# Patient Record
Sex: MALE | Race: WHITE | Employment: FULL TIME | URBAN - METROPOLITAN AREA
[De-identification: names, ages, dates, MRNs, and addresses within clinical notes are randomized per-mention and may not be internally consistent; named-entity substitution may affect disease eponyms.]

---

## 2017-11-02 ENCOUNTER — HOSPITAL ENCOUNTER (OUTPATIENT)
Age: 37
Setting detail: OBSERVATION
LOS: 1 days | Discharge: HOME | End: 2017-11-03
Attending: EMERGENCY MEDICINE | Admitting: FAMILY MEDICINE
Payer: COMMERCIAL

## 2017-11-02 ENCOUNTER — APPOINTMENT (OUTPATIENT)
Dept: GENERAL RADIOLOGY | Age: 37
End: 2017-11-02
Payer: COMMERCIAL

## 2017-11-02 DIAGNOSIS — I10 ESSENTIAL HYPERTENSION: ICD-10-CM

## 2017-11-02 DIAGNOSIS — E11.00 UNCONTROLLED TYPE 2 DIABETES MELLITUS WITH HYPEROSMOLARITY WITHOUT COMA, WITHOUT LONG-TERM CURRENT USE OF INSULIN (HCC): Primary | ICD-10-CM

## 2017-11-02 DIAGNOSIS — E66.9 OBESITY (BMI 30.0-34.9): ICD-10-CM

## 2017-11-02 PROBLEM — E10.9 TYPE 1 DIABETES MELLITUS WITHOUT COMPLICATION (HCC): Status: ACTIVE | Noted: 2017-11-02

## 2017-11-02 LAB
ABSOLUTE EOS #: 0.08 K/UL (ref 0–0.44)
ABSOLUTE IMMATURE GRANULOCYTE: 0.04 K/UL (ref 0–0.3)
ABSOLUTE LYMPH #: 2.52 K/UL (ref 1.1–3.7)
ABSOLUTE MONO #: 0.67 K/UL (ref 0.1–1.2)
ANION GAP SERPL CALCULATED.3IONS-SCNC: 16 MMOL/L (ref 9–17)
BASOPHILS # BLD: 1 % (ref 0–2)
BASOPHILS ABSOLUTE: 0.04 K/UL (ref 0–0.2)
BETA-HYDROXYBUTYRATE: 0.17 MMOL/L (ref 0.02–0.27)
BILIRUBIN URINE: NEGATIVE
BUN BLDV-MCNC: 14 MG/DL (ref 6–20)
BUN/CREAT BLD: ABNORMAL (ref 9–20)
CALCIUM SERPL-MCNC: 9 MG/DL (ref 8.6–10.4)
CHLORIDE BLD-SCNC: 94 MMOL/L (ref 98–107)
CHOLESTEROL/HDL RATIO: 8.8
CHOLESTEROL: 211 MG/DL
CO2: 24 MMOL/L (ref 20–31)
COLOR: YELLOW
COMMENT UA: ABNORMAL
CREAT SERPL-MCNC: 0.81 MG/DL (ref 0.7–1.2)
CREATININE URINE: 112.3 MG/DL (ref 39–259)
DIFFERENTIAL TYPE: ABNORMAL
EKG ATRIAL RATE: 92 BPM
EKG P-R INTERVAL: 126 MS
EKG Q-T INTERVAL: 356 MS
EKG QRS DURATION: 82 MS
EKG QTC CALCULATION (BAZETT): 440 MS
EKG R AXIS: 142 DEGREES
EKG T AXIS: 161 DEGREES
EKG VENTRICULAR RATE: 92 BPM
EOSINOPHILS RELATIVE PERCENT: 1 % (ref 1–4)
ESTIMATED AVERAGE GLUCOSE: 240 MG/DL
FOLATE: 12.9 NG/ML
GFR AFRICAN AMERICAN: >60 ML/MIN
GFR NON-AFRICAN AMERICAN: >60 ML/MIN
GFR SERPL CREATININE-BSD FRML MDRD: ABNORMAL ML/MIN/{1.73_M2}
GFR SERPL CREATININE-BSD FRML MDRD: ABNORMAL ML/MIN/{1.73_M2}
GLUCOSE BLD-MCNC: 316 MG/DL (ref 75–110)
GLUCOSE BLD-MCNC: 356 MG/DL (ref 75–110)
GLUCOSE BLD-MCNC: 362 MG/DL (ref 75–110)
GLUCOSE BLD-MCNC: 461 MG/DL (ref 75–110)
GLUCOSE BLD-MCNC: 465 MG/DL (ref 75–110)
GLUCOSE BLD-MCNC: 507 MG/DL (ref 75–110)
GLUCOSE BLD-MCNC: 538 MG/DL (ref 75–110)
GLUCOSE BLD-MCNC: 556 MG/DL (ref 70–99)
GLUCOSE URINE: ABNORMAL
HBA1C MFR BLD: 10 % (ref 4–6)
HCT VFR BLD CALC: 45.3 % (ref 40.7–50.3)
HDLC SERPL-MCNC: 24 MG/DL
HEMOGLOBIN: 14.8 G/DL (ref 13–17)
IMMATURE GRANULOCYTES: 1 %
INSULIN COMMENT: NORMAL
INSULIN REFERENCE RANGE:: NORMAL
INSULIN: 55.2 MU/L
KETONES, URINE: NEGATIVE
LDL CHOLESTEROL: 109 MG/DL (ref 0–130)
LEUKOCYTE ESTERASE, URINE: NEGATIVE
LYMPHOCYTES # BLD: 32 % (ref 24–43)
MCH RBC QN AUTO: 27.9 PG (ref 25.2–33.5)
MCHC RBC AUTO-ENTMCNC: 32.7 G/DL (ref 29.9–34.7)
MCV RBC AUTO: 85.5 FL (ref 82.6–102.9)
MICROALBUMIN/CREAT 24H UR: <12 MG/L
MICROALBUMIN/CREAT UR-RTO: NORMAL MCG/MG CREAT
MONOCYTES # BLD: 9 % (ref 3–12)
NITRITE, URINE: NEGATIVE
PDW BLD-RTO: 12.6 % (ref 11.8–14.4)
PH UA: 5 (ref 5–8)
PLATELET # BLD: ABNORMAL K/UL (ref 138–453)
PLATELET ESTIMATE: ABNORMAL
PLATELET, FLUORESCENCE: NORMAL K/UL (ref 138–453)
PLATELET, IMMATURE FRACTION: NORMAL % (ref 1.1–10.3)
PMV BLD AUTO: ABNORMAL FL (ref 8.1–13.5)
POC TROPONIN I: 0 NG/ML (ref 0–0.1)
POC TROPONIN I: 0 NG/ML (ref 0–0.1)
POC TROPONIN INTERP: NORMAL
POC TROPONIN INTERP: NORMAL
POTASSIUM SERPL-SCNC: 4.1 MMOL/L (ref 3.7–5.3)
PROTEIN UA: NEGATIVE
RBC # BLD: 5.3 M/UL (ref 4.21–5.77)
RBC # BLD: ABNORMAL 10*6/UL
SEG NEUTROPHILS: 58 % (ref 36–65)
SEGMENTED NEUTROPHILS ABSOLUTE COUNT: 4.53 K/UL (ref 1.5–8.1)
SODIUM BLD-SCNC: 134 MMOL/L (ref 135–144)
SPECIFIC GRAVITY UA: 1.03 (ref 1–1.03)
TRIGL SERPL-MCNC: 388 MG/DL
TURBIDITY: CLEAR
URINE HGB: NEGATIVE
UROBILINOGEN, URINE: NORMAL
VITAMIN B-12: 500 PG/ML (ref 211–946)
VITAMIN D 25-HYDROXY: 26.5 NG/ML (ref 30–100)
VLDLC SERPL CALC-MCNC: ABNORMAL MG/DL (ref 1–30)
WBC # BLD: 7.9 K/UL (ref 3.5–11.3)
WBC # BLD: ABNORMAL 10*3/UL

## 2017-11-02 PROCEDURE — 82306 VITAMIN D 25 HYDROXY: CPT

## 2017-11-02 PROCEDURE — 2580000003 HC RX 258: Performed by: NURSE PRACTITIONER

## 2017-11-02 PROCEDURE — 84484 ASSAY OF TROPONIN QUANT: CPT

## 2017-11-02 PROCEDURE — 93005 ELECTROCARDIOGRAM TRACING: CPT

## 2017-11-02 PROCEDURE — 82043 UR ALBUMIN QUANTITATIVE: CPT

## 2017-11-02 PROCEDURE — 80061 LIPID PANEL: CPT

## 2017-11-02 PROCEDURE — G0378 HOSPITAL OBSERVATION PER HR: HCPCS

## 2017-11-02 PROCEDURE — 82947 ASSAY GLUCOSE BLOOD QUANT: CPT

## 2017-11-02 PROCEDURE — 82570 ASSAY OF URINE CREATININE: CPT

## 2017-11-02 PROCEDURE — 6370000000 HC RX 637 (ALT 250 FOR IP): Performed by: FAMILY MEDICINE

## 2017-11-02 PROCEDURE — 6370000000 HC RX 637 (ALT 250 FOR IP): Performed by: NURSE PRACTITIONER

## 2017-11-02 PROCEDURE — 96360 HYDRATION IV INFUSION INIT: CPT

## 2017-11-02 PROCEDURE — 99285 EMERGENCY DEPT VISIT HI MDM: CPT

## 2017-11-02 PROCEDURE — 82746 ASSAY OF FOLIC ACID SERUM: CPT

## 2017-11-02 PROCEDURE — 2580000003 HC RX 258: Performed by: STUDENT IN AN ORGANIZED HEALTH CARE EDUCATION/TRAINING PROGRAM

## 2017-11-02 PROCEDURE — 99219 PR INITIAL OBSERVATION CARE/DAY 50 MINUTES: CPT | Performed by: FAMILY MEDICINE

## 2017-11-02 PROCEDURE — 82607 VITAMIN B-12: CPT

## 2017-11-02 PROCEDURE — 36415 COLL VENOUS BLD VENIPUNCTURE: CPT

## 2017-11-02 PROCEDURE — 83525 ASSAY OF INSULIN: CPT

## 2017-11-02 PROCEDURE — G0108 DIAB MANAGE TRN  PER INDIV: HCPCS

## 2017-11-02 PROCEDURE — 82010 KETONE BODYS QUAN: CPT

## 2017-11-02 PROCEDURE — 6370000000 HC RX 637 (ALT 250 FOR IP): Performed by: STUDENT IN AN ORGANIZED HEALTH CARE EDUCATION/TRAINING PROGRAM

## 2017-11-02 PROCEDURE — 85025 COMPLETE CBC W/AUTO DIFF WBC: CPT

## 2017-11-02 PROCEDURE — 96361 HYDRATE IV INFUSION ADD-ON: CPT

## 2017-11-02 PROCEDURE — 83036 HEMOGLOBIN GLYCOSYLATED A1C: CPT

## 2017-11-02 PROCEDURE — 81003 URINALYSIS AUTO W/O SCOPE: CPT

## 2017-11-02 PROCEDURE — 86038 ANTINUCLEAR ANTIBODIES: CPT

## 2017-11-02 PROCEDURE — 71020 XR CHEST STANDARD TWO VW: CPT

## 2017-11-02 PROCEDURE — 80048 BASIC METABOLIC PNL TOTAL CA: CPT

## 2017-11-02 RX ORDER — SODIUM CHLORIDE 450 MG/100ML
INJECTION, SOLUTION INTRAVENOUS CONTINUOUS
Status: DISCONTINUED | OUTPATIENT
Start: 2017-11-02 | End: 2017-11-03 | Stop reason: HOSPADM

## 2017-11-02 RX ORDER — NICOTINE POLACRILEX 4 MG
15 LOZENGE BUCCAL PRN
Status: DISCONTINUED | OUTPATIENT
Start: 2017-11-02 | End: 2017-11-03 | Stop reason: HOSPADM

## 2017-11-02 RX ORDER — SODIUM CHLORIDE 0.9 % (FLUSH) 0.9 %
10 SYRINGE (ML) INJECTION PRN
Status: DISCONTINUED | OUTPATIENT
Start: 2017-11-02 | End: 2017-11-03 | Stop reason: HOSPADM

## 2017-11-02 RX ORDER — BISACODYL 10 MG
10 SUPPOSITORY, RECTAL RECTAL DAILY PRN
Status: DISCONTINUED | OUTPATIENT
Start: 2017-11-02 | End: 2017-11-03 | Stop reason: HOSPADM

## 2017-11-02 RX ORDER — POTASSIUM CHLORIDE 7.45 MG/ML
10 INJECTION INTRAVENOUS PRN
Status: DISCONTINUED | OUTPATIENT
Start: 2017-11-02 | End: 2017-11-03 | Stop reason: HOSPADM

## 2017-11-02 RX ORDER — FAMOTIDINE 20 MG/1
20 TABLET, FILM COATED ORAL 2 TIMES DAILY
Status: DISCONTINUED | OUTPATIENT
Start: 2017-11-02 | End: 2017-11-03 | Stop reason: HOSPADM

## 2017-11-02 RX ORDER — ACETAMINOPHEN 325 MG/1
650 TABLET ORAL EVERY 4 HOURS PRN
Status: CANCELLED | OUTPATIENT
Start: 2017-11-02

## 2017-11-02 RX ORDER — ACETAMINOPHEN 325 MG/1
650 TABLET ORAL EVERY 4 HOURS PRN
Status: DISCONTINUED | OUTPATIENT
Start: 2017-11-02 | End: 2017-11-03 | Stop reason: HOSPADM

## 2017-11-02 RX ORDER — SODIUM CHLORIDE 0.9 % (FLUSH) 0.9 %
10 SYRINGE (ML) INJECTION EVERY 12 HOURS SCHEDULED
Status: DISCONTINUED | OUTPATIENT
Start: 2017-11-02 | End: 2017-11-03 | Stop reason: HOSPADM

## 2017-11-02 RX ORDER — LISINOPRIL 5 MG/1
5 TABLET ORAL DAILY
Status: DISCONTINUED | OUTPATIENT
Start: 2017-11-02 | End: 2017-11-03 | Stop reason: HOSPADM

## 2017-11-02 RX ORDER — MAGNESIUM SULFATE 1 G/100ML
1 INJECTION INTRAVENOUS PRN
Status: DISCONTINUED | OUTPATIENT
Start: 2017-11-02 | End: 2017-11-03 | Stop reason: HOSPADM

## 2017-11-02 RX ORDER — INSULIN GLARGINE 100 [IU]/ML
10 INJECTION, SOLUTION SUBCUTANEOUS NIGHTLY
Status: DISCONTINUED | OUTPATIENT
Start: 2017-11-02 | End: 2017-11-03

## 2017-11-02 RX ORDER — POTASSIUM CHLORIDE 20MEQ/15ML
40 LIQUID (ML) ORAL PRN
Status: DISCONTINUED | OUTPATIENT
Start: 2017-11-02 | End: 2017-11-03 | Stop reason: HOSPADM

## 2017-11-02 RX ORDER — POTASSIUM CHLORIDE 20 MEQ/1
40 TABLET, EXTENDED RELEASE ORAL PRN
Status: DISCONTINUED | OUTPATIENT
Start: 2017-11-02 | End: 2017-11-03 | Stop reason: HOSPADM

## 2017-11-02 RX ORDER — DEXTROSE MONOHYDRATE 25 G/50ML
12.5 INJECTION, SOLUTION INTRAVENOUS PRN
Status: DISCONTINUED | OUTPATIENT
Start: 2017-11-02 | End: 2017-11-03 | Stop reason: HOSPADM

## 2017-11-02 RX ORDER — 0.9 % SODIUM CHLORIDE 0.9 %
1000 INTRAVENOUS SOLUTION INTRAVENOUS ONCE
Status: COMPLETED | OUTPATIENT
Start: 2017-11-02 | End: 2017-11-02

## 2017-11-02 RX ORDER — NICOTINE 21 MG/24HR
1 PATCH, TRANSDERMAL 24 HOURS TRANSDERMAL DAILY PRN
Status: DISCONTINUED | OUTPATIENT
Start: 2017-11-02 | End: 2017-11-03 | Stop reason: HOSPADM

## 2017-11-02 RX ORDER — DEXTROSE MONOHYDRATE 50 MG/ML
100 INJECTION, SOLUTION INTRAVENOUS PRN
Status: DISCONTINUED | OUTPATIENT
Start: 2017-11-02 | End: 2017-11-03 | Stop reason: HOSPADM

## 2017-11-02 RX ORDER — MORPHINE SULFATE 4 MG/ML
4 INJECTION, SOLUTION INTRAMUSCULAR; INTRAVENOUS
Status: DISCONTINUED | OUTPATIENT
Start: 2017-11-02 | End: 2017-11-02

## 2017-11-02 RX ORDER — MORPHINE SULFATE 2 MG/ML
2 INJECTION, SOLUTION INTRAMUSCULAR; INTRAVENOUS
Status: DISCONTINUED | OUTPATIENT
Start: 2017-11-02 | End: 2017-11-02

## 2017-11-02 RX ORDER — ONDANSETRON 2 MG/ML
4 INJECTION INTRAMUSCULAR; INTRAVENOUS EVERY 6 HOURS PRN
Status: DISCONTINUED | OUTPATIENT
Start: 2017-11-02 | End: 2017-11-03 | Stop reason: HOSPADM

## 2017-11-02 RX ORDER — HYDROCODONE BITARTRATE AND ACETAMINOPHEN 5; 325 MG/1; MG/1
1 TABLET ORAL EVERY 4 HOURS PRN
Status: DISCONTINUED | OUTPATIENT
Start: 2017-11-02 | End: 2017-11-03 | Stop reason: HOSPADM

## 2017-11-02 RX ADMIN — SODIUM CHLORIDE: 4.5 INJECTION, SOLUTION INTRAVENOUS at 19:13

## 2017-11-02 RX ADMIN — SODIUM CHLORIDE: 4.5 INJECTION, SOLUTION INTRAVENOUS at 11:44

## 2017-11-02 RX ADMIN — METFORMIN HYDROCHLORIDE 500 MG: 500 TABLET ORAL at 11:40

## 2017-11-02 RX ADMIN — INSULIN LISPRO 8 UNITS: 100 INJECTION, SOLUTION INTRAVENOUS; SUBCUTANEOUS at 16:51

## 2017-11-02 RX ADMIN — ACETAMINOPHEN 650 MG: 325 TABLET ORAL at 16:57

## 2017-11-02 RX ADMIN — SODIUM CHLORIDE: 4.5 INJECTION, SOLUTION INTRAVENOUS at 04:42

## 2017-11-02 RX ADMIN — SODIUM CHLORIDE 1000 ML: 9 INJECTION, SOLUTION INTRAVENOUS at 01:27

## 2017-11-02 RX ADMIN — INSULIN GLARGINE 10 UNITS: 100 INJECTION, SOLUTION SUBCUTANEOUS at 21:20

## 2017-11-02 RX ADMIN — INSULIN LISPRO 12 UNITS: 100 INJECTION, SOLUTION INTRAVENOUS; SUBCUTANEOUS at 07:32

## 2017-11-02 RX ADMIN — INSULIN HUMAN 5 UNITS: 100 INJECTION, SOLUTION PARENTERAL at 01:50

## 2017-11-02 RX ADMIN — FAMOTIDINE 20 MG: 20 TABLET, FILM COATED ORAL at 21:20

## 2017-11-02 RX ADMIN — INSULIN LISPRO 5 UNITS: 100 INJECTION, SOLUTION INTRAVENOUS; SUBCUTANEOUS at 21:20

## 2017-11-02 RX ADMIN — LISINOPRIL 5 MG: 5 TABLET ORAL at 10:22

## 2017-11-02 RX ADMIN — LINAGLIPTIN 5 MG: 5 TABLET, FILM COATED ORAL at 11:40

## 2017-11-02 RX ADMIN — INSULIN LISPRO 10 UNITS: 100 INJECTION, SOLUTION INTRAVENOUS; SUBCUTANEOUS at 10:56

## 2017-11-02 RX ADMIN — METFORMIN HYDROCHLORIDE 500 MG: 500 TABLET ORAL at 18:11

## 2017-11-02 RX ADMIN — FAMOTIDINE 20 MG: 20 TABLET, FILM COATED ORAL at 08:11

## 2017-11-02 ASSESSMENT — PAIN SCALES - GENERAL
PAINLEVEL_OUTOF10: 4
PAINLEVEL_OUTOF10: 5

## 2017-11-02 ASSESSMENT — ENCOUNTER SYMPTOMS
NAUSEA: 0
BACK PAIN: 0
VOMITING: 0
SHORTNESS OF BREATH: 0
DIARRHEA: 0
WHEEZING: 0
ABDOMINAL PAIN: 0

## 2017-11-02 NOTE — PLAN OF CARE
Problem: Falls - Risk of:  Goal: Will remain free from falls  Will remain free from falls  Outcome: Ongoing  Patient free from falls this shift.

## 2017-11-02 NOTE — ED PROVIDER NOTES
[facility] ED     Emergency Department     Faculty Attestation    I performed a history and physical examination of the patient and discussed management with the resident. I reviewed the residents note and agree with the documented findings and plan of care. Any areas of disagreement are noted on the chart. I was personally present for the key portions of any procedures. I have documented in the chart those procedures where I was not present during the key portions. I have reviewed the emergency nurses triage note. I agree with the chief complaint, past medical history, past surgical history, allergies, medications, social and family history as documented unless otherwise noted below. For Physician Assistant/ Nurse Practitioner cases/documentation I have personally evaluated this patient and have completed at least one if not all key elements of the E/M (history, physical exam, and MDM). Additional findings are as noted. Patient presents stating that over the past week he has had increased dizziness, visual changes, increased thirst and urination. The patient has no significant medical history but says he has not seen a physician or had any lab testing done in several years. On arrival here, patient's blood glucose is in the 500s. We'll plan to get additional labs and admit patient for new onset diabetes. Patient does not have a family physician.     EKG Interpretation    Interpreted by emergency department physician    Rhythm: normal sinus   Rate: normal  Axis: right  Ectopy: none  Conduction: normal  ST Segments: normal  T Waves: non specific changes  Q Waves: none    Clinical Impression: non-specific EKG    Bruce Garcia MD  Attending Emergency  Physician              Desmond Souza MD  11/02/17 0120

## 2017-11-02 NOTE — PROGRESS NOTES
[address]  Occupational Therapy Not Seen Note     Patient not available for Occupational Therapy due to:     [] Testing:     [] Hemodialysis     [] Blood Transfusion in Progress     []Refusal by Patient:      [] Surgery/Procedure:     [] Strict Bedrest     [] Sedation     [] Spine Precautions      [] Pt being transferred to palliative care at this time. Spoke with pt/family and OT services to be defered.     [x] Pt independent with functional mobility and functional tasks.  Pt with no OT acute care needs at this time, will defer OT eval.     [] Other    Next Scheduled Treatment: N/A     Signature: Calvin Esparza, OTR/L

## 2017-11-02 NOTE — ED NOTES
Bed: 18  Expected date: 11/2/17  Expected time: 12:27 AM  Means of arrival: [facility]  Comments:  CARLI Lucio 82, RN  11/02/17 0827

## 2017-11-02 NOTE — PROGRESS NOTES
Behzad Rubio,  Seen for evaluation and education on Other: New onset type 2 diabetes. Had all the classic symptoms. Bs at admission 505. Awaiting results of HgbA1c. Pt was feeling crummy and was working on getting a PCP prior to admission. Realizes he doesn't eat healthy and is ready to make changes. Worked with pt and family. Pt's grandfather had diabetes (no longer alive) and has an aquaintance with type 1 diabetes on an insulin pump. No results found for: LABA1C  No results found for: EAG    Discussed with Piaregan Ramiro, his current self care routines prior to this admission. 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 Didn't review insulin administration as MD said pt to go home with Januvia and metformin. Showed use of blood glucose monitoring.  (after breakfast). Briefly discussed role of diet, physical activity, daily use of medications and self monitoring for good diabetes control, provided diabetes education folder. Also referred patient to view diabetes education programs on education TV - Channel 75 and 76 at 9am, 3pm 6pm and 9pm      Discussed need to be aware of sign and symptoms of hypoglycemia and hyperglycemia  and treatment for hypoglycemia and hyperglycemia. When to call PCP for advice / sick day plan. Provided patient with card and contact information for out patient  Diabetes education services and encouraged follow up with a PCP. Would recommend follow -up education at outpatient diabetes education at [place] [id number]. An ordered is needed for this service and can be placed via EHR. Discharge Navigator --- Med Reconciliation -- New orders for discharge tab -- search diabetic ed - choose REF 20 -- review and sign. Gave pt info on our program as well as [facility] where he is closer.      Patient will need prescriptions for the following supplies at discharge:   Preferred / formulary

## 2017-11-02 NOTE — ED PROVIDER NOTES
[facility] [address]  ED  Emergency Department Encounter  Emergency Medicine Resident     Pt Name: Markie Pickens  MRN: [id number]  Armstrongfjayme 1980  Date of evaluation: 11/2/17  PCP:  No primary care provider on file. CHIEF COMPLAINT       Chief Complaint   Patient presents with    Urinary Frequency    Polydipsia    Fatigue    Dizziness       HISTORY OF PRESENT ILLNESS  (Location/Symptom, Timing/Onset, Context/Setting, Quality, Duration, Modifying Factors, Severity.)      Srinivasa Thacker is a 29 y.o. male who presents with Illness for fatigue, dizziness, polyuria, polydipsia. Patient states that he was at work today felt extremely fatigued and dizzy and sat down and his boss called 911. He notes that over the past 5 days he has been increasingly thirsty and \"clean a lot\". He notes that he does not have a primary care physician and does not regularly follow up with any medical provider. Denies any chest pain, shortness of breath, nausea, vomiting, diarrhea, fever, chills. PAST MEDICAL / SURGICAL / SOCIAL / FAMILY HISTORY      has a past medical history of No active medical problems. has a past surgical history that includes Foot surgery (Left). Social History     Social History    Marital status:      Spouse name: N/A    Number of children: N/A    Years of education: N/A     Occupational History    Not on file. Social History Main Topics    Smoking status: Never Smoker    Smokeless tobacco: Current User    Alcohol use No    Drug use: No    Sexual activity: Not on file     Other Topics Concern    Not on file     Social History Narrative    No narrative on file       Patient was advised to stop smoking or to avoid tobacco use    History reviewed. No pertinent family history.     Allergies:  Pcn [penicillins]    Home Medications:  Prior to Admission medications    Not on File       REVIEW OF SYSTEMS    (2-9 systems for level 4, 10 or more for level 5)      Review of Systems   Constitutional: Positive for appetite change and fatigue. Negative for activity change, chills and fever. Eyes: Positive for visual disturbance. Respiratory: Negative for shortness of breath and wheezing. Cardiovascular: Negative for chest pain and palpitations. Gastrointestinal: Negative for abdominal pain, diarrhea, nausea and vomiting. Endocrine: Positive for polydipsia and polyuria. Genitourinary: Positive for frequency. Negative for difficulty urinating, dysuria and hematuria. Musculoskeletal: Negative for back pain and neck pain. Neurological: Positive for dizziness. Negative for speech difficulty and headaches. Psychiatric/Behavioral: Negative for suicidal ideas. PHYSICAL EXAM   (up to 7 for level 4, 8 or more for level 5)      INITIAL VITALS:   BP (!) 152/83   Pulse 81   Temp 97.5 °F (36.4 °C) (Oral)   Resp 20   Wt 220 lb (99.8 kg)   SpO2 97%     Physical Exam   Constitutional: He is oriented to person, place, and time. He appears well-developed and well-nourished. No distress. HENT:   Head: Normocephalic and atraumatic. Eyes: Conjunctivae and EOM are normal. Pupils are equal, round, and reactive to light. Neck: Normal range of motion. Neck supple. Cardiovascular: Normal rate, regular rhythm, normal heart sounds and intact distal pulses. Pulmonary/Chest: Effort normal and breath sounds normal. No stridor. No respiratory distress. He has no wheezes. Abdominal: Soft. Bowel sounds are normal. There is no tenderness. There is no rebound. Musculoskeletal: Normal range of motion. He exhibits no edema. Neurological: He is alert and oriented to person, place, and time. No cranial nerve deficit. Skin: Skin is warm and dry. He is not diaphoretic. Psychiatric: He has a normal mood and affect. Thought content normal.   Nursing note and vitals reviewed.     DIFFERENTIAL  DIAGNOSIS     PLAN (LABS / IMAGING / EKG):  Orders Placed This Encounter   Procedures    XR CHEST STANDARD (2 VW)    CBC Auto Differential    Basic Metabolic Panel    HEMOGLOBIN A1C    Beta-Hydroxybutyrate    URINALYSIS    Immature Platelet Fraction    Inpatient consult to Hospitalist    POCT troponin    POCT troponin    POC Glucose Fingerstick    EKG 12 Lead       MEDICATIONS ORDERED:  Orders Placed This Encounter   Medications    0.9 % sodium chloride bolus    insulin regular (HUMULIN R;NOVOLIN R) injection 5 Units       DDX: Diabetes, DKA, dehydration, electrolyte imbalance, closed head injury, heat related emergency, rhabdomyolysis.  Arrhythmia, ACS,     DIAGNOSTIC RESULTS / EMERGENCY DEPARTMENT COURSE / MDM     LABS:  Results for orders placed or performed during the hospital encounter of 11/02/17   CBC Auto Differential   Result Value Ref Range    WBC 7.9 3.5 - 11.3 k/uL    RBC 5.30 4.21 - 5.77 m/uL    Hemoglobin 14.8 13.0 - 17.0 g/dL    Hematocrit 45.3 40.7 - 50.3 %    MCV 85.5 82.6 - 102.9 fL    MCH 27.9 25.2 - 33.5 pg    MCHC 32.7 29.9 - 34.7 g/dL    RDW 12.6 11.8 - 14.4 %    Platelets See Reflexed IPF Result 138 - 453 k/uL    MPV NOT REPORTED 8.1 - 13.5 fL    Differential Type NOT REPORTED     WBC Morphology NOT REPORTED     RBC Morphology NOT REPORTED     Platelet Estimate NOT REPORTED     Seg Neutrophils 58 36 - 65 %    Lymphocytes 32 24 - 43 %    Monocytes 9 3 - 12 %    Eosinophils % 1 1 - 4 %    Basophils 1 0 - 2 %    Immature Granulocytes 1 (H) 0 %    Segs Absolute 4.53 1.50 - 8.10 k/uL    Absolute Lymph # 2.52 1.10 - 3.70 k/uL    Absolute Mono # 0.67 0.10 - 1.20 k/uL    Absolute Eos # 0.08 0.00 - 0.44 k/uL    Basophils # 0.04 0.00 - 0.20 k/uL    Absolute Immature Granulocyte 0.04 0.00 - 0.30 k/uL   Basic Metabolic Panel   Result Value Ref Range    Glucose 556 (HH) 70 - 99 mg/dL    BUN 14 6 - 20 mg/dL    CREATININE 0.81 0.70 - 1.20 mg/dL    Bun/Cre Ratio NOT REPORTED 9 - 20    Calcium 9.0 8.6 - 10.4 mg/dL    Sodium 134 (L) 135 - 144 mmol/L    Potassium 4.1 3.7 - 5.3 mmol/L this chart in the absence of a cardiologist.    EMERGENCY DEPARTMENT COURSE:  Patient is a 52-year-old male who presents with a five-day history of polydipsia, polyuria, and fatigue. Patient was found to have new diagnosed diabetes was given a fluid, 5 units of regular insulin and admitted to [facility] service for Diabetic education, further evaluation and monitoring. [facility] provider called and accepted the admission to [place] [address] floor. Facilitated admission orders were placed. PROCEDURES:  None    CONSULTS:  IP CONSULT TO HOSPITALIST      FINAL IMPRESSION      1. Uncontrolled type 2 diabetes mellitus with hyperosmolarity without coma, without long-term current use of insulin ([facility] [address]          DISPOSITION / PLAN     DISPOSITION Decision To Admit    PATIENT REFERRED TO:  No follow-up provider specified.     DISCHARGE MEDICATIONS:  New Prescriptions    No medications on file       Luis Manuel Ervin DO  Emergency Medicine Resident    (Please note that portions of this note were completed with a voice recognition program.  Efforts were made to edit the dictations but occasionally words are mis-transcribed.)       Luis Manuel Ervin DO  Resident  11/02/17 1235

## 2017-11-02 NOTE — H&P
[facility]    HISTORY AND PHYSICAL EXAMINATION            Date:   11/2/2017  Patient name:  Jada Corea  Date of admission:  11/2/2017 12:44 AM  MRN:   [id number]  Account:  [de-identified]  YOB: 1980  PCP:    No primary care provider on file. Room:   [address] [place] [address]  Code Status:    Full Code    Chief Complaint:     Chief Complaint   Patient presents with    Urinary Frequency    Polydipsia    Fatigue    Dizziness       History Obtained From:     patient, electronic medical record. This is a 76-year-old male patient with no significant past medical history and does not have a PCP was well until a week ago when he started to have gradual onset of fatigue, dizziness, polyuria, polydipsia, and it was something that he would not tolerate well. He was at work he felt extremely dizzy and his employer called 911 and presented to the ED and was noted to have elevated blood sugar, round 500 and was admitted for further management. Patient is currently on insulin 10 units Lantus, And no sliding scale. Patient works as a  and has very minimal exercise and he has a family history of diabetes. Patient works at night and has poor diet patterns. He prefers oral medication but once the best as he is newly diagnosed diabetes. Past Medical History:     Past Medical History:   Diagnosis Date    DM (diabetes mellitus) ([facility] [address]     No active medical problems     Obesity         Past Surgical History:     Past Surgical History:   Procedure Laterality Date    FOOT SURGERY Left         Medications Prior to Admission:     Prior to Admission medications    Not on File        Allergies:     Pcn [penicillins]    Social History:     Tobacco:    reports that he has never smoked. He uses smokeless tobacco.  Alcohol:      reports that he does not drink alcohol. Drug Use:  reports that he does not use drugs.     Family History:     Family movements intact, conjunctiva clear  Ear: normal external ear, no discharge, hearing intact  Nose:  no drainage noted  Mouth: mucous membranes moist  Neck: supple, no carotid bruits, thyroid not palpable  Lungs: Bilateral equal air entry, clear to ausculation, no wheezing, rales or rhonchi, normal effort  Cardiovascular: normal rate, regular rhythm, no murmur, gallop, rub. Abdomen: Soft, nontender, nondistended, normal bowel sounds, no hepatomegaly or splenomegaly  Neurologic: There are no new focal motor or sensory deficits, normal muscle tone and bulk, no abnormal sensation, normal speech, cranial nerves II through XII grossly intact  Skin: No gross lesions, rashes, bruising or bleeding on exposed skin area  Extremities:  peripheral pulses palpable, no pedal edema or calf pain with palpation  Psych: normal affect     Osteopathic Examination: negative  negative for  myalgias, arthralgias, pain, swelling, stiffness, decreased range of motion, muscle weakness or bone pain. There is no evidence of kyphosis, scoliosis, or lordosis. Patient refused examination  Unable to perform due to patients current medical condition    Investigations:      Laboratory Testing:  Recent Results (from the past 24 hour(s))   POCT troponin    Collection Time: 11/02/17 12:50 AM   Result Value Ref Range    POC Troponin I 0.00 0.00 - 0.10 ng/mL    POC Troponin Interp       The Troponin-I (POC) results cannot be compared to the Troponin-T results.    EKG 12 Lead    Collection Time: 11/02/17 12:54 AM   Result Value Ref Range    Ventricular Rate 92 BPM    Atrial Rate 92 BPM    P-R Interval 126 ms    QRS Duration 82 ms    Q-T Interval 356 ms    QTc Calculation (Bazett) 440 ms    R Axis 142 degrees    T Axis 161 degrees   POC Glucose Fingerstick    Collection Time: 11/02/17  1:05 AM   Result Value Ref Range    POC Glucose 538 (HH) 75 - 110 mg/dL   CBC Auto Differential    Collection Time: 11/02/17  1:07 AM   Result Value Ref Range    WBC 7.9 3.5 - 11.3 k/uL    RBC 5.30 4.21 - 5.77 m/uL    Hemoglobin 14.8 13.0 - 17.0 g/dL    Hematocrit 45.3 40.7 - 50.3 %    MCV 85.5 82.6 - 102.9 fL    MCH 27.9 25.2 - 33.5 pg    MCHC 32.7 29.9 - 34.7 g/dL    RDW 12.6 11.8 - 14.4 %    Platelets See Reflexed IPF Result 138 - 453 k/uL    MPV NOT REPORTED 8.1 - 13.5 fL    Differential Type NOT REPORTED     WBC Morphology NOT REPORTED     RBC Morphology NOT REPORTED     Platelet Estimate NOT REPORTED     Seg Neutrophils 58 36 - 65 %    Lymphocytes 32 24 - 43 %    Monocytes 9 3 - 12 %    Eosinophils % 1 1 - 4 %    Basophils 1 0 - 2 %    Immature Granulocytes 1 (H) 0 %    Segs Absolute 4.53 1.50 - 8.10 k/uL    Absolute Lymph # 2.52 1.10 - 3.70 k/uL    Absolute Mono # 0.67 0.10 - 1.20 k/uL    Absolute Eos # 0.08 0.00 - 0.44 k/uL    Basophils # 0.04 0.00 - 0.20 k/uL    Absolute Immature Granulocyte 0.04 0.00 - 0.30 k/uL   Basic Metabolic Panel    Collection Time: 11/02/17  1:07 AM   Result Value Ref Range    Glucose 556 (HH) 70 - 99 mg/dL    BUN 14 6 - 20 mg/dL    CREATININE 0.81 0.70 - 1.20 mg/dL    Bun/Cre Ratio NOT REPORTED 9 - 20    Calcium 9.0 8.6 - 10.4 mg/dL    Sodium 134 (L) 135 - 144 mmol/L    Potassium 4.1 3.7 - 5.3 mmol/L    Chloride 94 (L) 98 - 107 mmol/L    CO2 24 20 - 31 mmol/L    Anion Gap 16 9 - 17 mmol/L    GFR Non-African American >60 >60 mL/min    GFR African American >60 >60 mL/min    GFR Comment          GFR Staging NOT REPORTED    Beta-Hydroxybutyrate    Collection Time: 11/02/17  1:07 AM   Result Value Ref Range    Beta-Hydroxybutyrate 0.17 0.02 - 0.27 mmol/L   URINALYSIS    Collection Time: 11/02/17  1:07 AM   Result Value Ref Range    Color, UA YELLOW YEL    Turbidity UA CLEAR CLEAR    Glucose, Ur 3+ (A) NEG    Bilirubin Urine NEGATIVE NEG    Ketones, Urine NEGATIVE NEG    Specific Gravity, UA 1.034 (H) 1.005 - 1.030    Urine Hgb NEGATIVE NEG    pH, UA 5.0 5.0 - 8.0    Protein, UA NEGATIVE NEG    Urobilinogen, Urine Normal NORM    Nitrite, Urine NEGATIVE NEG a hospital setting. Renetta Ruby MD  11/2/2017  4:01 PM    Copy sent to Dr. Marizol Beavers primary care provider on file.

## 2017-11-02 NOTE — PLAN OF CARE
Problem: Falls - Risk of:  Goal: Will remain free from falls  Will remain free from falls   Outcome: Met This Shift

## 2017-11-02 NOTE — PLAN OF CARE
Problem: Nutrition  Goal: Optimal nutrition therapy  Outcome: Ongoing  Nutrition Problem:  (Food and nutrition knowledge-related deficit )  Intervention: Food and/or Nutrient Delivery: Modify current diet  Nutritional Goals: Pt able to identify 3 carbohydrate sources and serving sizes.

## 2017-11-03 VITALS
OXYGEN SATURATION: 96 % | BODY MASS INDEX: 41.67 KG/M2 | RESPIRATION RATE: 21 BRPM | HEART RATE: 63 BPM | SYSTOLIC BLOOD PRESSURE: 128 MMHG | TEMPERATURE: 97.9 F | DIASTOLIC BLOOD PRESSURE: 94 MMHG | HEIGHT: 67 IN | WEIGHT: 265.5 LBS

## 2017-11-03 PROBLEM — E55.9 VITAMIN D INSUFFICIENCY: Status: ACTIVE | Noted: 2017-11-03

## 2017-11-03 LAB
ALBUMIN SERPL-MCNC: 3.5 G/DL (ref 3.5–5.2)
ALBUMIN/GLOBULIN RATIO: 1.3 (ref 1–2.5)
ALP BLD-CCNC: 85 U/L (ref 40–129)
ALT SERPL-CCNC: 82 U/L (ref 5–41)
ANION GAP SERPL CALCULATED.3IONS-SCNC: 12 MMOL/L (ref 9–17)
ANTI-NUCLEAR ANTIBODY (ANA): NEGATIVE
AST SERPL-CCNC: 39 U/L
BILIRUB SERPL-MCNC: 0.39 MG/DL (ref 0.3–1.2)
BUN BLDV-MCNC: 17 MG/DL (ref 6–20)
BUN/CREAT BLD: ABNORMAL (ref 9–20)
CALCIUM SERPL-MCNC: 8.6 MG/DL (ref 8.6–10.4)
CHLORIDE BLD-SCNC: 102 MMOL/L (ref 98–107)
CO2: 23 MMOL/L (ref 20–31)
CREAT SERPL-MCNC: 0.77 MG/DL (ref 0.7–1.2)
GFR AFRICAN AMERICAN: >60 ML/MIN
GFR NON-AFRICAN AMERICAN: >60 ML/MIN
GFR SERPL CREATININE-BSD FRML MDRD: ABNORMAL ML/MIN/{1.73_M2}
GFR SERPL CREATININE-BSD FRML MDRD: ABNORMAL ML/MIN/{1.73_M2}
GLUCOSE BLD-MCNC: 294 MG/DL (ref 75–110)
GLUCOSE BLD-MCNC: 345 MG/DL (ref 70–99)
GLUCOSE BLD-MCNC: 346 MG/DL (ref 75–110)
GLUCOSE BLD-MCNC: 367 MG/DL (ref 75–110)
HCT VFR BLD CALC: 45 % (ref 40.7–50.3)
HEMOGLOBIN: 14.5 G/DL (ref 13–17)
MCH RBC QN AUTO: 28.7 PG (ref 25.2–33.5)
MCHC RBC AUTO-ENTMCNC: 32.2 G/DL (ref 29.9–34.7)
MCV RBC AUTO: 89.1 FL (ref 82.6–102.9)
PDW BLD-RTO: 12.9 % (ref 11.8–14.4)
PLATELET # BLD: 137 K/UL (ref 138–453)
PMV BLD AUTO: 12.3 FL (ref 8.1–13.5)
POTASSIUM SERPL-SCNC: 4.2 MMOL/L (ref 3.7–5.3)
RBC # BLD: 5.05 M/UL (ref 4.21–5.77)
SODIUM BLD-SCNC: 137 MMOL/L (ref 135–144)
TOTAL PROTEIN: 6.2 G/DL (ref 6.4–8.3)
TSH SERPL DL<=0.05 MIU/L-ACNC: 1.83 MIU/L (ref 0.3–5)
WBC # BLD: 9.9 K/UL (ref 3.5–11.3)

## 2017-11-03 PROCEDURE — 80053 COMPREHEN METABOLIC PANEL: CPT

## 2017-11-03 PROCEDURE — 96361 HYDRATE IV INFUSION ADD-ON: CPT

## 2017-11-03 PROCEDURE — 82947 ASSAY GLUCOSE BLOOD QUANT: CPT

## 2017-11-03 PROCEDURE — 6370000000 HC RX 637 (ALT 250 FOR IP): Performed by: NURSE PRACTITIONER

## 2017-11-03 PROCEDURE — 36415 COLL VENOUS BLD VENIPUNCTURE: CPT

## 2017-11-03 PROCEDURE — 6370000000 HC RX 637 (ALT 250 FOR IP): Performed by: FAMILY MEDICINE

## 2017-11-03 PROCEDURE — 85027 COMPLETE CBC AUTOMATED: CPT

## 2017-11-03 PROCEDURE — 84443 ASSAY THYROID STIM HORMONE: CPT

## 2017-11-03 PROCEDURE — 2580000003 HC RX 258: Performed by: NURSE PRACTITIONER

## 2017-11-03 PROCEDURE — 99217 PR OBSERVATION CARE DISCHARGE MANAGEMENT: CPT | Performed by: FAMILY MEDICINE

## 2017-11-03 PROCEDURE — G0378 HOSPITAL OBSERVATION PER HR: HCPCS

## 2017-11-03 RX ORDER — LISINOPRIL 5 MG/1
5 TABLET ORAL DAILY
Qty: 30 TABLET | Refills: 3 | Status: SHIPPED | OUTPATIENT
Start: 2017-11-04 | End: 2018-02-13 | Stop reason: SDUPTHER

## 2017-11-03 RX ORDER — GLIPIZIDE 5 MG/1
5 TABLET ORAL
Status: DISCONTINUED | OUTPATIENT
Start: 2017-11-03 | End: 2017-11-03 | Stop reason: HOSPADM

## 2017-11-03 RX ORDER — LANCETS
EACH MISCELLANEOUS
Qty: 100 EACH | Refills: 3 | Status: SHIPPED | OUTPATIENT
Start: 2017-11-03

## 2017-11-03 RX ORDER — GLIPIZIDE 5 MG/1
5 TABLET ORAL
Qty: 60 TABLET | Refills: 3 | Status: SHIPPED | OUTPATIENT
Start: 2017-11-04 | End: 2017-12-04 | Stop reason: SDUPTHER

## 2017-11-03 RX ADMIN — FAMOTIDINE 20 MG: 20 TABLET, FILM COATED ORAL at 09:02

## 2017-11-03 RX ADMIN — INSULIN LISPRO 8 UNITS: 100 INJECTION, SOLUTION INTRAVENOUS; SUBCUTANEOUS at 09:51

## 2017-11-03 RX ADMIN — METFORMIN HYDROCHLORIDE 500 MG: 500 TABLET ORAL at 15:56

## 2017-11-03 RX ADMIN — LISINOPRIL 5 MG: 5 TABLET ORAL at 09:02

## 2017-11-03 RX ADMIN — INSULIN LISPRO 10 UNITS: 100 INJECTION, SOLUTION INTRAVENOUS; SUBCUTANEOUS at 17:09

## 2017-11-03 RX ADMIN — INSULIN LISPRO 6 UNITS: 100 INJECTION, SOLUTION INTRAVENOUS; SUBCUTANEOUS at 12:04

## 2017-11-03 RX ADMIN — GLIPIZIDE 5 MG: 5 TABLET ORAL at 15:56

## 2017-11-03 RX ADMIN — METFORMIN HYDROCHLORIDE 1000 MG: 500 TABLET ORAL at 17:09

## 2017-11-03 RX ADMIN — ACETAMINOPHEN 650 MG: 325 TABLET ORAL at 07:32

## 2017-11-03 RX ADMIN — LINAGLIPTIN 5 MG: 5 TABLET, FILM COATED ORAL at 09:02

## 2017-11-03 RX ADMIN — SODIUM CHLORIDE: 4.5 INJECTION, SOLUTION INTRAVENOUS at 11:53

## 2017-11-03 RX ADMIN — METFORMIN HYDROCHLORIDE 500 MG: 500 TABLET ORAL at 09:02

## 2017-11-03 ASSESSMENT — PAIN SCALES - GENERAL
PAINLEVEL_OUTOF10: 4
PAINLEVEL_OUTOF10: 5

## 2017-11-03 NOTE — DISCHARGE SUMMARY
11/03/2017    K 4.2 11/03/2017     11/03/2017    CO2 23 11/03/2017    ANIONGAP 12 11/03/2017    BUN 17 11/03/2017    CREATININE 0.77 11/03/2017    BUNCRER NOT REPORTED 11/03/2017    CALCIUM 8.6 11/03/2017    LABGLOM >60 11/03/2017    GFRAA >60 11/03/2017    GFR      11/03/2017    GFR NOT REPORTED 11/03/2017     HFP:    Lab Results   Component Value Date    PROT 6.2 11/03/2017     CMP:    Lab Results   Component Value Date    GLUCOSE 345 11/03/2017     11/03/2017    K 4.2 11/03/2017     11/03/2017    CO2 23 11/03/2017    BUN 17 11/03/2017    CREATININE 0.77 11/03/2017    ANIONGAP 12 11/03/2017    ALKPHOS 85 11/03/2017    ALT 82 11/03/2017    AST 39 11/03/2017    BILITOT 0.39 11/03/2017    LABALBU 3.5 11/03/2017    ALBUMIN 1.3 11/03/2017    LABGLOM >60 11/03/2017    GFRAA >60 11/03/2017    GFR      11/03/2017    GFR NOT REPORTED 11/03/2017    PROT 6.2 11/03/2017    CALCIUM 8.6 11/03/2017     PT/INR:  No results found for: PTINR  PTT: No results found for: APTT  FLP:    Lab Results   Component Value Date    CHOL 211 11/02/2017    TRIG 388 11/02/2017    HDL 24 11/02/2017     U/A:    Lab Results   Component Value Date    COLORU YELLOW 11/02/2017    TURBIDITY CLEAR 11/02/2017    SPECGRAV 1.034 11/02/2017    HGBUR NEGATIVE 11/02/2017    PHUR 5.0 11/02/2017    PROTEINU NEGATIVE 11/02/2017    GLUCOSEU 3+ 11/02/2017    KETUA NEGATIVE 11/02/2017    BILIRUBINUR NEGATIVE 11/02/2017    UROBILINOGEN Normal 11/02/2017    NITRU NEGATIVE 11/02/2017    LEUKOCYTESUR NEGATIVE 11/02/2017     TSH:    Lab Results   Component Value Date    TSH 1.83 11/03/2017     HBA1C       Radiology:    Xr Chest Standard (2 Vw)    Result Date: 11/2/2017  EXAMINATION: TWO VIEWS OF THE CHEST 11/2/2017 1:17 am COMPARISON: None. HISTORY: ORDERING SYSTEM PROVIDED HISTORY: near syncope TECHNOLOGIST PROVIDED HISTORY: Reason for exam:->near syncope FINDINGS: Heart size and configuration are normal.  The lungs are clear.   No pneumothorax or pleural effusion. There is a small azygos fissure. No acute bone finding. No acute cardiopulmonary disease. Consultations:    Consults:     Final Specialist Recommendations/Findings:   IP CONSULT TO HOSPITALIST  IP CONSULT TO DIABETES EDUCATOR  IP CONSULT TO DIETITIAN      The patient was seen and examined on day of discharge and this discharge summary is in conjunction with any daily progress note from day of discharge. Discharge plan:     Disposition: Home    Physician Follow Up:      [facility] PCP. PATIENT WILL SEE PCP NEXT WEEK TUESDAY    Requiring Further Evaluation/Follow Up POST HOSPITALIZATION/Incidental Findings: DM MANAGEMENT.  AST ELEVATION FOLLOW UP AND LOW PLATELETS FOLLOW UP    Diet: diabetic diet    Activity: As tolerated    Instructions to Patient: DIET AND EXERCISE AND CLOSE GLUCOSE MANAGEMENT.WEIGHT LOSS    Discharge Medications:      Medication List      START taking these medications    Accu-Chek Multiclix Lancets Misc  Check blood sugar fasting and 2 hour after lunch an ddinner and keep a record and take it to your doctor on tuesday     glipiZIDE 5 MG tablet  Commonly known as:  GLUCOTROL  Take 1 tablet by mouth every morning (before breakfast)  Start taking on:  11/4/2017     lisinopril 5 MG tablet  Commonly known as:  PRINIVIL;ZESTRIL  Take 1 tablet by mouth daily  Start taking on:  11/4/2017     metFORMIN 1000 MG tablet  Commonly known as:  GLUCOPHAGE  Take 1 tablet by mouth 2 times daily (with meals)  Start taking on:  11/4/2017     SITagliptin 100 MG tablet  Commonly known as:  JANUVIA  Take 1 tablet by mouth daily           Where to Get Your Medications      These medications were sent to [facility] [address] [facility], [address] [facility]  [address] [facility], [address]  [address]    Phone:  [phone]   · Accu-Chek Multiclix Lancets Misc  · glipiZIDE 5 MG tablet  · lisinopril 5 MG tablet  · metFORMIN 1000 MG tablet  · SITagliptin 100 MG tablet         Time Spent on discharge is  35 mins in patient examination, evaluation, counseling as well as medication reconciliation, prescriptions for required medications, discharge plan and follow up. Electronically signed by   Charanjit Hernández MD  11/3/2017  6:07 PM      Thank you for the opportunity to be involved in this patient's care.

## 2017-11-03 NOTE — PROGRESS NOTES
appointment on Tuesday. Overall, he sugars have been trending downwards, but still elevated. Patient was discharged in stable condition on oral medication    Review of Systems:     Constitutional:  negative for chills, fevers, sweats  Respiratory:  negative for cough, dyspnea on exertion, hemoptysis, shortness of breath, wheezing  Cardiovascular:  negative for chest pain, chest pressure/discomfort, lower extremity edema, palpitations  Gastrointestinal:  negative for abdominal pain, constipation, diarrhea, nausea, vomiting  Neurological:  negative for dizziness, headache    Medications: Allergies: Allergies   Allergen Reactions    Pcn [Penicillins] Swelling and Rash       Current Meds:   Scheduled Meds:    metFORMIN  1,000 mg Oral BID WC    glipiZIDE  5 mg Oral QAM AC    sodium chloride flush  10 mL Intravenous 2 times per day    sodium chloride flush  10 mL Intravenous 2 times per day    famotidine  20 mg Oral BID    insulin lispro  0-12 Units Subcutaneous TID WC    insulin lispro  0-6 Units Subcutaneous Nightly    lisinopril  5 mg Oral Daily    linagliptin  5 mg Oral Daily     Continuous Infusions:    sodium chloride 125 mL/hr at 11/03/17 1153    dextrose       PRN Meds: sodium chloride flush, sodium chloride flush, potassium chloride **OR** potassium chloride **OR** potassium chloride, magnesium sulfate, acetaminophen, HYDROcodone 5 mg - acetaminophen, magnesium hydroxide, bisacodyl, ondansetron, nicotine, glucose, dextrose, glucagon (rDNA), dextrose, HYDROmorphone **OR** HYDROmorphone    Data:     Past Medical History:   has a past medical history of DM (diabetes mellitus) ([facility] [address] No active medical problems; and Obesity. Social History:   reports that he has never smoked. He uses smokeless tobacco. He reports that he does not drink alcohol or use drugs.      Family History:   Family History   Problem Relation Age of Onset    Diabetes Paternal Grandmother        Vitals:  BP (!) 128/94 Pulse 63   Temp 97.9 °F (36.6 °C) (Oral)   Resp 21   Ht 5' 7\" (1.702 m)   Wt 265 lb 8 oz (120.4 kg)   SpO2 96%   BMI 41.58 kg/m²   Temp (24hrs), Av.9 °F (36.6 °C), Min:97.9 °F (36.6 °C), Max:97.9 °F (36.6 °C)    Recent Labs      17   0817   1158  17   1614   POCGLU  356*  346*  294*  367*       I/O (24Hr):   No intake or output data in the 24 hours ending 17    Labs:    Hematology:  Recent Labs      17   0655   WBC  7.9  9.9   RBC  5.30  5.05   HGB  14.8  14.5   HCT  45.3  45.0   MCV  85.5  89.1   MCH  27.9  28.7   MCHC  32.7  32.2   RDW  12.6  12.9   PLT  See Reflexed IPF Result  137*   MPV  NOT REPORTED  12.3     Chemistry:  Recent Labs      17   0050  17   0107  17   0300  17   0655   NA   --   134*   --   137   K   --   4.1   --   4.2   CL   --   94*   --   102   CO2   --   24   --   23   GLUCOSE   --   556*   --   345*   BUN   --   14   --   17   CREATININE   --   0.81   --   0.77   ANIONGAP   --   16   --   12   LABGLOM   --   >60   --   >60   GFRAA   --   >60   --   >60   CALCIUM   --   9.0   --   8.6   TROPONINI  0.00   --   0.00   --      Recent Labs      17   01017   1046  17   1052  17   1644  17   0655  17   0807  17   1158  17   1614   PROT   --    --    --    --    --    --   6.2*   --    --    --    LABALBU   --    --    --    --    --    --   3.5   --    --    --    LABA1C  10.0*   --    --    --    --    --    --    --    --    --    TSH   --    --    --    --    --    --   1.83   --    --    --    AST   --    --    --    --    --    --   39   --    --    --    ALT   --    --    --    --    --    --   80*   --    --    --    ALKPHOS   --    --    --    --    --    --   85   --    --    --    BILITOT   --    --    --    --    --    --   0.39   --    --    --    CHOL   --    --   211*   --    --    --    --    --    --    -- HDL   --    --   24*   --    --    --    --    --    --    --    LDLCHOLESTEROL   --    --   109   --    --    --    --    --    --    --    CHOLHDLRATIO   --    --   8.8*   --    --    --    --    --    --    --    TRIG   --    --   388*   --    --    --    --    --    --    --    VLDL   --    --   NOT REPORTED   --    --    --    --    --    --    --    POCGLU   --    < >   --   362*  316*  356*   --   346*  294*  367*    < > = values in this interval not displayed. No results found for: SPECIAL  No results found for: CULTURE    No results found for: POCPH, PHART, PH, POCPCO2, WDJ0FXW, PCO2, POCPO2, PO2ART, PO2, POCHCO3, NDS7VYA, HCO3, NBEA, PBEA, BEART, BE, THGBART, THB, JEQ1RQH, AFYZ8SSV, N2EAGTYG, O2SAT, FIO2    Radiology:    Physical Examination:        General appearance:  alert, cooperative and no distress  Mental Status:  oriented to person, place and time and normal affect  Lungs:  clear to auscultation bilaterally, normal effort  Heart:  regular rate and rhythm, no murmur  Abdomen:  soft, nontender, nondistended, normal bowel sounds, no masses, hepatomegaly, splenomegaly  Extremities:  no edema, redness, tenderness in the calves  Skin:  no gross lesions, rashes, induration    Assessment:        Primary Problem  Type 1 diabetes mellitus without complication [facility])    Active Hospital Problems    Diagnosis Date Noted    Type 1 diabetes mellitus without complication ([facility] [I60.1] 11/02/2017     Priority: High    Obesity (BMI 30.0-34. 9) [E66.9] 11/02/2017     Priority: High    Essential hypertension [I10] 11/02/2017     Priority: Medium    Vitamin D insufficiency [E55.9] 11/03/2017       Plan:        1. New-onset diabetes mellitus. Patient was advised to continue metformin thousand twice a day and was sent home on Januvia 100 mg by mouth daily and Glucotrol 5 mg by mouth daily.   Patient was also advised that he needs to check his blood sugars fasting 2 hours after lunch and dinner and keep a log and take it to his PCP on Tuesday. He was counseled extensively about diet and excise. Patient seems to understand and is willing to follow through with the plan. Scripts were given for his glucometer test strips and lancets and as well as medication to [facility] was arranged to go home with medication  Discussed with patient about yearly eye as well as feet examined. 2. Hypertension. 3. Obesity. Patient was advised and counseled about diet and excise and will continue to follow. 4. Vitamin D Insufficiency:Vitamin D 50,000IUweekly for 12 weeks. 5. Disposition: Home with oral medications. F/U PCP NEXT [address] Tyesha Nicole MD  11/3/2017  10:33 PM

## 2017-11-07 PROBLEM — E10.9 TYPE 1 DIABETES MELLITUS WITHOUT COMPLICATION (HCC): Status: RESOLVED | Noted: 2017-11-02 | Resolved: 2017-11-07

## 2017-11-09 PROBLEM — E11.41 TYPE 2 DIABETES MELLITUS WITH DIABETIC MONONEUROPATHY, WITHOUT LONG-TERM CURRENT USE OF INSULIN (HCC): Status: ACTIVE | Noted: 2017-11-09

## 2018-12-13 DIAGNOSIS — E11.00 UNCONTROLLED TYPE 2 DIABETES MELLITUS WITH HYPEROSMOLARITY WITHOUT COMA, WITHOUT LONG-TERM CURRENT USE OF INSULIN (HCC): ICD-10-CM

## 2018-12-13 DIAGNOSIS — E66.9 OBESITY (BMI 30.0-34.9): ICD-10-CM

## 2019-07-02 DIAGNOSIS — E11.00 UNCONTROLLED TYPE 2 DIABETES MELLITUS WITH HYPEROSMOLARITY WITHOUT COMA, WITHOUT LONG-TERM CURRENT USE OF INSULIN (HCC): ICD-10-CM

## 2019-07-02 DIAGNOSIS — E66.9 OBESITY (BMI 30.0-34.9): ICD-10-CM

## 2019-07-02 RX ORDER — LISINOPRIL 5 MG/1
TABLET ORAL
Qty: 90 TABLET | Refills: 4 | Status: SHIPPED | OUTPATIENT
Start: 2019-07-02 | End: 2020-09-21

## 2019-07-02 RX ORDER — GLIPIZIDE 5 MG/1
TABLET ORAL
Qty: 90 TABLET | Refills: 4 | Status: SHIPPED | OUTPATIENT
Start: 2019-07-02 | End: 2020-09-21

## 2019-07-15 ENCOUNTER — OFFICE VISIT (OUTPATIENT)
Dept: PRIMARY CARE CLINIC | Age: 39
End: 2019-07-15
Payer: COMMERCIAL

## 2019-07-15 VITALS
SYSTOLIC BLOOD PRESSURE: 118 MMHG | DIASTOLIC BLOOD PRESSURE: 86 MMHG | WEIGHT: 261.4 LBS | HEIGHT: 67 IN | HEART RATE: 87 BPM | RESPIRATION RATE: 16 BRPM | BODY MASS INDEX: 41.03 KG/M2 | OXYGEN SATURATION: 97 %

## 2019-07-15 DIAGNOSIS — I10 ESSENTIAL HYPERTENSION: ICD-10-CM

## 2019-07-15 DIAGNOSIS — E11.41 TYPE 2 DIABETES MELLITUS WITH DIABETIC MONONEUROPATHY, WITHOUT LONG-TERM CURRENT USE OF INSULIN (HCC): Primary | ICD-10-CM

## 2019-07-15 DIAGNOSIS — Z13.6 ENCOUNTER FOR LIPID SCREENING FOR CARDIOVASCULAR DISEASE: ICD-10-CM

## 2019-07-15 DIAGNOSIS — Z13.220 ENCOUNTER FOR LIPID SCREENING FOR CARDIOVASCULAR DISEASE: ICD-10-CM

## 2019-07-15 DIAGNOSIS — Z23 ENCOUNTER FOR IMMUNIZATION: ICD-10-CM

## 2019-07-15 LAB
CREATININE URINE POCT: NORMAL
HBA1C MFR BLD: 13.8 %
MICROALBUMIN/CREAT 24H UR: NORMAL MG/G{CREAT}
MICROALBUMIN/CREAT UR-RTO: NORMAL

## 2019-07-15 PROCEDURE — 82044 UR ALBUMIN SEMIQUANTITATIVE: CPT | Performed by: NURSE PRACTITIONER

## 2019-07-15 PROCEDURE — 90732 PPSV23 VACC 2 YRS+ SUBQ/IM: CPT | Performed by: NURSE PRACTITIONER

## 2019-07-15 PROCEDURE — 83036 HEMOGLOBIN GLYCOSYLATED A1C: CPT | Performed by: NURSE PRACTITIONER

## 2019-07-15 PROCEDURE — 90471 IMMUNIZATION ADMIN: CPT | Performed by: NURSE PRACTITIONER

## 2019-07-15 PROCEDURE — 99213 OFFICE O/P EST LOW 20 MIN: CPT | Performed by: NURSE PRACTITIONER

## 2019-07-15 ASSESSMENT — ENCOUNTER SYMPTOMS
BACK PAIN: 1
NAUSEA: 0
SINUS PRESSURE: 0
SHORTNESS OF BREATH: 0
EYE DISCHARGE: 0
CONSTIPATION: 0
VOMITING: 0
DIARRHEA: 0
COUGH: 0
BLURRED VISION: 1
EYE ITCHING: 0

## 2019-07-15 ASSESSMENT — PATIENT HEALTH QUESTIONNAIRE - PHQ9
SUM OF ALL RESPONSES TO PHQ QUESTIONS 1-9: 0
SUM OF ALL RESPONSES TO PHQ QUESTIONS 1-9: 0
SUM OF ALL RESPONSES TO PHQ9 QUESTIONS 1 & 2: 0
2. FEELING DOWN, DEPRESSED OR HOPELESS: 0
1. LITTLE INTEREST OR PLEASURE IN DOING THINGS: 0

## 2019-07-18 LAB
BASOPHILS ABSOLUTE: 0.1 /ΜL
BASOPHILS RELATIVE PERCENT: 1 %
BILIRUBIN, URINE: NEGATIVE
BLOOD, URINE: NEGATIVE
BUN BLDV-MCNC: 17 MG/DL
CALCIUM SERPL-MCNC: 9.2 MG/DL
CHLORIDE BLD-SCNC: 105 MMOL/L
CLARITY: CLEAR
CO2: NORMAL MMOL/L
COLOR: YELLOW
CREAT SERPL-MCNC: 0.81 MG/DL
EOSINOPHILS ABSOLUTE: 0 /ΜL
EOSINOPHILS RELATIVE PERCENT: 0 %
GFR CALCULATED: 107
GLUCOSE BLD-MCNC: 162 MG/DL
GLUCOSE URINE: NEGATIVE
HCT VFR BLD CALC: 43.7 % (ref 41–53)
HEMOGLOBIN: 14.5 G/DL (ref 13.5–17.5)
KETONES, URINE: NEGATIVE
LEUKOCYTE ESTERASE, URINE: NEGATIVE
LYMPHOCYTES ABSOLUTE: 2.3 /ΜL
LYMPHOCYTES RELATIVE PERCENT: 19 %
MCH RBC QN AUTO: 28.8 PG
MCHC RBC AUTO-ENTMCNC: 33.3 G/DL
MCV RBC AUTO: 87 FL
MONOCYTES ABSOLUTE: 1 /ΜL
MONOCYTES RELATIVE PERCENT: 8 %
NEUTROPHILS ABSOLUTE: 8.5 /ΜL
NEUTROPHILS RELATIVE PERCENT: 71 %
NITRITE, URINE: NEGATIVE
PDW BLD-RTO: 13.8 %
PH UA: 5.5 (ref 4.5–8)
PLATELET # BLD: 146 K/ΜL
PMV BLD AUTO: 9.7 FL
POTASSIUM SERPL-SCNC: 4.4 MMOL/L
PROTEIN UA: NEGATIVE
RBC # BLD: 5.05 10^6/ΜL
SODIUM BLD-SCNC: 138 MMOL/L
SPECIFIC GRAVITY, URINE: 1.02
UROBILINOGEN, URINE: NORMAL
WBC # BLD: 12 10^3/ML

## 2019-10-21 ENCOUNTER — OFFICE VISIT (OUTPATIENT)
Dept: PRIMARY CARE CLINIC | Age: 39
End: 2019-10-21
Payer: COMMERCIAL

## 2019-10-21 VITALS
OXYGEN SATURATION: 98 % | WEIGHT: 270 LBS | SYSTOLIC BLOOD PRESSURE: 132 MMHG | BODY MASS INDEX: 42.38 KG/M2 | DIASTOLIC BLOOD PRESSURE: 86 MMHG | HEIGHT: 67 IN | RESPIRATION RATE: 16 BRPM | HEART RATE: 86 BPM

## 2019-10-21 DIAGNOSIS — Z23 ENCOUNTER FOR IMMUNIZATION: ICD-10-CM

## 2019-10-21 DIAGNOSIS — E11.41 TYPE 2 DIABETES MELLITUS WITH DIABETIC MONONEUROPATHY, WITHOUT LONG-TERM CURRENT USE OF INSULIN (HCC): Primary | ICD-10-CM

## 2019-10-21 DIAGNOSIS — E66.9 OBESITY (BMI 30.0-34.9): ICD-10-CM

## 2019-10-21 DIAGNOSIS — E11.00 UNCONTROLLED TYPE 2 DIABETES MELLITUS WITH HYPEROSMOLARITY WITHOUT COMA, WITHOUT LONG-TERM CURRENT USE OF INSULIN (HCC): ICD-10-CM

## 2019-10-21 DIAGNOSIS — Z13.6 ENCOUNTER FOR LIPID SCREENING FOR CARDIOVASCULAR DISEASE: ICD-10-CM

## 2019-10-21 DIAGNOSIS — Z13.220 ENCOUNTER FOR LIPID SCREENING FOR CARDIOVASCULAR DISEASE: ICD-10-CM

## 2019-10-21 DIAGNOSIS — I10 ESSENTIAL HYPERTENSION: ICD-10-CM

## 2019-10-21 DIAGNOSIS — H61.22 IMPACTED CERUMEN OF LEFT EAR: ICD-10-CM

## 2019-10-21 LAB — HBA1C MFR BLD: 6.9 %

## 2019-10-21 PROCEDURE — 69209 REMOVE IMPACTED EAR WAX UNI: CPT | Performed by: NURSE PRACTITIONER

## 2019-10-21 PROCEDURE — 90686 IIV4 VACC NO PRSV 0.5 ML IM: CPT | Performed by: NURSE PRACTITIONER

## 2019-10-21 PROCEDURE — 90471 IMMUNIZATION ADMIN: CPT | Performed by: NURSE PRACTITIONER

## 2019-10-21 PROCEDURE — 83036 HEMOGLOBIN GLYCOSYLATED A1C: CPT | Performed by: NURSE PRACTITIONER

## 2019-10-21 PROCEDURE — 99214 OFFICE O/P EST MOD 30 MIN: CPT | Performed by: NURSE PRACTITIONER

## 2019-10-21 RX ORDER — ATORVASTATIN CALCIUM 10 MG/1
10 TABLET, FILM COATED ORAL DAILY
Qty: 90 TABLET | Refills: 1 | Status: SHIPPED | OUTPATIENT
Start: 2019-10-21

## 2019-10-21 ASSESSMENT — ENCOUNTER SYMPTOMS
EYE PAIN: 0
BACK PAIN: 0
CONSTIPATION: 0
EYE REDNESS: 0
NAUSEA: 0
SHORTNESS OF BREATH: 0
BLURRED VISION: 1
DIARRHEA: 0
COUGH: 0

## 2019-12-28 DIAGNOSIS — E66.9 OBESITY (BMI 30.0-34.9): ICD-10-CM

## 2019-12-28 DIAGNOSIS — E11.00 UNCONTROLLED TYPE 2 DIABETES MELLITUS WITH HYPEROSMOLARITY WITHOUT COMA, WITHOUT LONG-TERM CURRENT USE OF INSULIN (HCC): ICD-10-CM

## 2019-12-30 RX ORDER — SITAGLIPTIN 100 MG/1
TABLET, FILM COATED ORAL
Qty: 30 TABLET | Refills: 2 | Status: SHIPPED | OUTPATIENT
Start: 2019-12-30 | End: 2020-06-22 | Stop reason: SDUPTHER

## 2021-02-23 DIAGNOSIS — E66.9 OBESITY (BMI 30.0-34.9): ICD-10-CM

## 2021-02-23 DIAGNOSIS — E11.41 TYPE 2 DIABETES MELLITUS WITH DIABETIC MONONEUROPATHY, WITHOUT LONG-TERM CURRENT USE OF INSULIN (HCC): ICD-10-CM

## 2021-02-23 DIAGNOSIS — E11.00 UNCONTROLLED TYPE 2 DIABETES MELLITUS WITH HYPEROSMOLARITY WITHOUT COMA, WITHOUT LONG-TERM CURRENT USE OF INSULIN (HCC): ICD-10-CM

## 2021-02-23 RX ORDER — GLIPIZIDE 5 MG/1
5 TABLET ORAL DAILY
Qty: 30 TABLET | Refills: 0 | Status: SHIPPED | OUTPATIENT
Start: 2021-02-23

## 2021-02-23 NOTE — TELEPHONE ENCOUNTER
Sent 30 day supply. Patient is overdue for Diabetic check. Please call and schedule. I can not continue to prescribe medication without seeing him. He must be seen.

## 2021-02-23 NOTE — TELEPHONE ENCOUNTER
Left detailed message asking patient to call back for an appointment. I informed him that there was only a month supply sent to pharm and that patient needs to be seen before we can give him anymore.

## 2021-02-23 NOTE — TELEPHONE ENCOUNTER
Patient is requesting some medication refills. He states that he has been out of his medications for only a few days but I don't see that we sent any in for months. He has not been seen since 10/2019. I offered patient an appointment but he refused. He only wants refills.  Please advise    [facility] in [place]

## 2023-08-01 ENCOUNTER — OFFICE VISIT (OUTPATIENT)
Dept: PRIMARY CARE CLINIC | Age: 43
End: 2023-08-01
Payer: COMMERCIAL

## 2023-08-01 VITALS
DIASTOLIC BLOOD PRESSURE: 100 MMHG | BODY MASS INDEX: 28.05 KG/M2 | SYSTOLIC BLOOD PRESSURE: 140 MMHG | WEIGHT: 179.1 LBS | OXYGEN SATURATION: 98 % | HEART RATE: 94 BPM

## 2023-08-01 DIAGNOSIS — Z13.220 LIPID SCREENING: ICD-10-CM

## 2023-08-01 DIAGNOSIS — G25.81 RESTLESS LEGS SYNDROME: ICD-10-CM

## 2023-08-01 DIAGNOSIS — I10 ESSENTIAL HYPERTENSION: ICD-10-CM

## 2023-08-01 DIAGNOSIS — E11.41 TYPE 2 DIABETES MELLITUS WITH DIABETIC MONONEUROPATHY, WITHOUT LONG-TERM CURRENT USE OF INSULIN (HCC): Primary | ICD-10-CM

## 2023-08-01 DIAGNOSIS — R63.4 WEIGHT LOSS: ICD-10-CM

## 2023-08-01 LAB — HBA1C MFR BLD: 9.2 %

## 2023-08-01 PROCEDURE — 99214 OFFICE O/P EST MOD 30 MIN: CPT | Performed by: NURSE PRACTITIONER

## 2023-08-01 PROCEDURE — 3077F SYST BP >= 140 MM HG: CPT | Performed by: NURSE PRACTITIONER

## 2023-08-01 PROCEDURE — G8427 DOCREV CUR MEDS BY ELIG CLIN: HCPCS | Performed by: NURSE PRACTITIONER

## 2023-08-01 PROCEDURE — 1036F TOBACCO NON-USER: CPT | Performed by: NURSE PRACTITIONER

## 2023-08-01 PROCEDURE — 83037 HB GLYCOSYLATED A1C HOME DEV: CPT | Performed by: NURSE PRACTITIONER

## 2023-08-01 PROCEDURE — 3046F HEMOGLOBIN A1C LEVEL >9.0%: CPT | Performed by: NURSE PRACTITIONER

## 2023-08-01 PROCEDURE — 3080F DIAST BP >= 90 MM HG: CPT | Performed by: NURSE PRACTITIONER

## 2023-08-01 PROCEDURE — G8419 CALC BMI OUT NRM PARAM NOF/U: HCPCS | Performed by: NURSE PRACTITIONER

## 2023-08-01 PROCEDURE — 2022F DILAT RTA XM EVC RTNOPTHY: CPT | Performed by: NURSE PRACTITIONER

## 2023-08-01 RX ORDER — LISINOPRIL 5 MG/1
5 TABLET ORAL DAILY
Qty: 30 TABLET | Refills: 0 | Status: SHIPPED | OUTPATIENT
Start: 2023-08-01 | End: 2023-08-01 | Stop reason: SDUPTHER

## 2023-08-01 RX ORDER — LISINOPRIL 5 MG/1
5 TABLET ORAL DAILY
Qty: 30 TABLET | Refills: 0 | Status: SHIPPED | OUTPATIENT
Start: 2023-08-01

## 2023-08-01 RX ORDER — INSULIN GLARGINE 100 [IU]/ML
6 INJECTION, SOLUTION SUBCUTANEOUS NIGHTLY
Qty: 5 ADJUSTABLE DOSE PRE-FILLED PEN SYRINGE | Refills: 3 | Status: SHIPPED | OUTPATIENT
Start: 2023-08-01 | End: 2023-08-01 | Stop reason: SDUPTHER

## 2023-08-01 RX ORDER — INSULIN GLARGINE 100 [IU]/ML
6 INJECTION, SOLUTION SUBCUTANEOUS NIGHTLY
Qty: 5 ADJUSTABLE DOSE PRE-FILLED PEN SYRINGE | Refills: 3 | Status: SHIPPED | OUTPATIENT
Start: 2023-08-01 | End: 2023-08-03 | Stop reason: SDUPTHER

## 2023-08-01 SDOH — ECONOMIC STABILITY: FOOD INSECURITY: WITHIN THE PAST 12 MONTHS, THE FOOD YOU BOUGHT JUST DIDN'T LAST AND YOU DIDN'T HAVE MONEY TO GET MORE.: NEVER TRUE

## 2023-08-01 SDOH — ECONOMIC STABILITY: INCOME INSECURITY: HOW HARD IS IT FOR YOU TO PAY FOR THE VERY BASICS LIKE FOOD, HOUSING, MEDICAL CARE, AND HEATING?: NOT HARD AT ALL

## 2023-08-01 SDOH — ECONOMIC STABILITY: FOOD INSECURITY: WITHIN THE PAST 12 MONTHS, YOU WORRIED THAT YOUR FOOD WOULD RUN OUT BEFORE YOU GOT MONEY TO BUY MORE.: NEVER TRUE

## 2023-08-01 SDOH — ECONOMIC STABILITY: HOUSING INSECURITY
IN THE LAST 12 MONTHS, WAS THERE A TIME WHEN YOU DID NOT HAVE A STEADY PLACE TO SLEEP OR SLEPT IN A SHELTER (INCLUDING NOW)?: NO

## 2023-08-01 ASSESSMENT — PATIENT HEALTH QUESTIONNAIRE - PHQ9
SUM OF ALL RESPONSES TO PHQ QUESTIONS 1-9: 0
1. LITTLE INTEREST OR PLEASURE IN DOING THINGS: 0
SUM OF ALL RESPONSES TO PHQ QUESTIONS 1-9: 0
2. FEELING DOWN, DEPRESSED OR HOPELESS: 0
SUM OF ALL RESPONSES TO PHQ9 QUESTIONS 1 & 2: 0

## 2023-08-01 NOTE — PROGRESS NOTES
1 each by Does not apply route every 14 days, Disp-2 each, R-3Normal  -     insulin glargine (LANTUS SOLOSTAR) 100 UNIT/ML injection pen; Inject 6 Units into the skin nightly, Disp-5 Adjustable Dose Pre-filled Pen Syringe, R-3Start with 6 unit daily. Monitor blood sugar 2x/day. Average your blood sugar for 3 days, if the average is above 200 then add 2 units to daily dose of lantus. Repeat until average blood sugar is below 200. Normal  -     lisinopril (PRINIVIL;ZESTRIL) 5 MG tablet; Take 1 tablet by mouth daily, Disp-30 tablet, R-0Normal  2. Essential hypertension  -     CBC with Auto Differential; Future  -     Iron and TIBC; Future  -     Ferritin; Future  -     Transferrin; Future  -     Comprehensive Metabolic Panel; Future  -     TSH With Reflex Ft4; Future  -     lisinopril (PRINIVIL;ZESTRIL) 5 MG tablet; Take 1 tablet by mouth daily, Disp-30 tablet, R-0Normal  3. Weight loss  -     CBC with Auto Differential; Future  -     Iron and TIBC; Future  -     Ferritin; Future  -     Transferrin; Future  -     Comprehensive Metabolic Panel; Future  -     TSH With Reflex Ft4; Future  4. Lipid screening  -     Lipid, Fasting; Future  5. Restless legs syndrome  -     CBC with Auto Differential; Future  -     Iron and TIBC; Future  -     Ferritin; Future  -     Transferrin; Future     Lantus and  will need to titrate up - Start with 6 unit daily. Monitor blood sugar 2x/day. Average your blood sugar for 3 days, if the average is above 200 then add 2 units to daily dose of lantus. Repeat until average blood sugar is below 200. Freestyle troy  Restart lisinopril  Complete lab work    Return in about 3 months (around 11/1/2023) for diabetes.   Data Unavailable     Orders Placed This Encounter   Procedures    Lipid, Fasting     Standing Status:   Future     Standing Expiration Date:   8/1/2024    CBC with Auto Differential     Standing Status:   Future     Standing Expiration Date:   8/1/2024    Iron and TIBC     Standing

## 2023-08-03 ENCOUNTER — TELEPHONE (OUTPATIENT)
Dept: PRIMARY CARE CLINIC | Age: 43
End: 2023-08-03

## 2023-08-03 DIAGNOSIS — E11.41 TYPE 2 DIABETES MELLITUS WITH DIABETIC MONONEUROPATHY, WITHOUT LONG-TERM CURRENT USE OF INSULIN (HCC): ICD-10-CM

## 2023-08-03 RX ORDER — INSULIN GLARGINE 100 [IU]/ML
6 INJECTION, SOLUTION SUBCUTANEOUS NIGHTLY
Qty: 5 ADJUSTABLE DOSE PRE-FILLED PEN SYRINGE | Refills: 3 | Status: SHIPPED | OUTPATIENT
Start: 2023-08-03 | End: 2023-08-03 | Stop reason: SDUPTHER

## 2023-08-03 RX ORDER — INSULIN GLARGINE 100 [IU]/ML
6 INJECTION, SOLUTION SUBCUTANEOUS NIGHTLY
Qty: 5 ADJUSTABLE DOSE PRE-FILLED PEN SYRINGE | Refills: 3 | Status: SHIPPED | OUTPATIENT
Start: 2023-08-03

## 2023-08-28 ENCOUNTER — TELEPHONE (OUTPATIENT)
Dept: PRIMARY CARE CLINIC | Age: 43
End: 2023-08-28

## 2024-10-31 NOTE — TELEPHONE ENCOUNTER
Received fax from pharmacy, lantus needs the max units per day put on the rx so that they can bill insurance. Please reorder with that on it.
Yes